# Patient Record
Sex: FEMALE | Race: WHITE | NOT HISPANIC OR LATINO | URBAN - METROPOLITAN AREA
[De-identification: names, ages, dates, MRNs, and addresses within clinical notes are randomized per-mention and may not be internally consistent; named-entity substitution may affect disease eponyms.]

---

## 2022-10-24 PROBLEM — F34.1 DYSTHYMIC DISORDER: Status: ACTIVE | Noted: 2022-10-24

## 2022-10-24 PROBLEM — F41.1 GENERALIZED ANXIETY DISORDER: Status: ACTIVE | Noted: 2022-10-24

## 2022-10-24 RX ORDER — DOXEPIN HYDROCHLORIDE 25 MG/1
25 CAPSULE ORAL
COMMUNITY
End: 2022-10-26 | Stop reason: SDUPTHER

## 2022-10-24 RX ORDER — ALPRAZOLAM 0.5 MG/1
TABLET ORAL
COMMUNITY
End: 2022-10-26 | Stop reason: SDUPTHER

## 2022-10-24 RX ORDER — ESCITALOPRAM OXALATE 10 MG/1
10 TABLET ORAL DAILY
COMMUNITY
End: 2022-10-26 | Stop reason: SDUPTHER

## 2022-10-26 ENCOUNTER — TELEMEDICINE (OUTPATIENT)
Dept: PSYCHIATRY | Facility: CLINIC | Age: 82
End: 2022-10-26
Payer: COMMERCIAL

## 2022-10-26 DIAGNOSIS — F34.1 DYSTHYMIC DISORDER: Primary | ICD-10-CM

## 2022-10-26 DIAGNOSIS — F41.1 GENERALIZED ANXIETY DISORDER: ICD-10-CM

## 2022-10-26 PROCEDURE — 99212 OFFICE O/P EST SF 10 MIN: CPT | Performed by: NURSE PRACTITIONER

## 2022-10-26 RX ORDER — DOXEPIN HYDROCHLORIDE 25 MG/1
CAPSULE ORAL
Qty: 180 CAPSULE | Refills: 1 | Status: SHIPPED | OUTPATIENT
Start: 2022-10-26

## 2022-10-26 RX ORDER — ESCITALOPRAM OXALATE 10 MG/1
TABLET ORAL
Qty: 90 TABLET | Refills: 1 | Status: SHIPPED | OUTPATIENT
Start: 2022-10-26

## 2022-10-26 RX ORDER — ALPRAZOLAM 0.5 MG/1
TABLET ORAL
Qty: 180 TABLET | Refills: 1 | Status: SHIPPED | OUTPATIENT
Start: 2022-10-26

## 2022-10-26 NOTE — PSYCH
Virtual Regular Visit    Verification of patient location:t at home    Patient is located in the following state in which I hold an active license [place]      Assessment/Plan:       Diagnoses and all orders for this visit:    Dysthymic disorder  -     escitalopram (LEXAPRO) 10 mg tablet; Take 1 PO QD  -     doxepin (SINEquan) 25 mg capsule; Take 1-2 PO HS PRN    Generalized anxiety disorder  -     escitalopram (LEXAPRO) 10 mg tablet; Take 1 PO QD  -     ALPRAZolam (XANAX) 0 5 mg tablet; Take 1 PO BID    Other orders  -     Discontinue: ALPRAZolam (XANAX) 0 5 mg tablet; Take by mouth daily at bedtime as needed Take 1 PO BID  -     Discontinue: doxepin (SINEquan) 25 mg capsule; Take 25 mg by mouth daily at bedtime Take 1-2 PO HS PRN  -     Discontinue: escitalopram (LEXAPRO) 10 mg tablet; Take 10 mg by mouth daily Take 1 PO QD          Goals addressed in session:   Good Health  Counseling provided:      Treatment Recommendations- Risks Benefits       Immediate Medical/Psychiatric/Psychotherapy Treatments and Any Precautions:     Risks, Benefits And Possible Side Effects Of Medications:  Risks, benefits, and possible side effects of medications explained to patient and patient verbalizes understanding    Controlled Medication Discussion: The patient has been filling controlled prescriptions on time as prescribed to [facility]      Reason for visit is No chief complaint on file  Medication Management      Encounter provider GARCÍA Cedeno    Provider [place] [address] [place] [address]  [phone]      Recent Visits  No visits were found meeting these conditions    Showing recent visits within past 7 days and meeting all other requirements  Today's Visits  Date Type Provider Dept   10/26/22 Telemedicine Gage Cedeno [facility] today's visits and meeting all other requirements  Future Appointments  No visits were found meeting these conditions  Showing future appointments within next 150 days and meeting all other requirements       The patient was identified by name and date of birth  Ryan Gold was informed that this is a telemedicine visit and that the visit is being conducted throughthe [employer] platform  She agrees to proceed     My office door was closed  No one else was in the room  She acknowledged consent and understanding of privacy and security of the video platform  The patient has agreed to participate and understands they can discontinue the visit at any time  Patient is aware this is a billable service  Subjective    Ryan Gold is a 81 y o  female    here today for a med check  This was over [facility]    normal appetite      HPI Mood good  Worried about daughter and her situation  Anxiety manageable  No problems with medication  Appetite Sleep good  Health OK  Denies SI/HI    No past medical history on file  No past surgical history on file  Current Outpatient Medications   Medication Sig Dispense Refill   • ALPRAZolam (XANAX) 0 5 mg tablet Take 1 PO  tablet 1   • doxepin (SINEquan) 25 mg capsule Take 1-2 PO HS  capsule 1   • escitalopram (LEXAPRO) 10 mg tablet Take 1 PO QD 90 tablet 1     No current facility-administered medications for this visit  Not on File    Social History     Substance and Sexual Activity   Drug Use Not on file       No family history on file          Objective    Mental status:  Appearance calm and cooperative  and adequate hygiene and grooming   Mood mood appropriate   Affect affect appropriate    Speech a normal rate and fluent   Thought Processes normal thought processes   Hallucinations no hallucinations present    Thought Content no delusions   Abnormal Thoughts no suicidal thoughts  and no homicidal thoughts    Orientation  oriented to person and place and time   Remote Memory short term memory intact and long term memory intact   Attention Span concentration intact   Intellect Appears to be of Average Intelligence   Insight Insight intact   Judgement judgment was intact   Muscle Strength Muscle strength and tone were normal and Normal gait    Language no difficulty naming common objects   Fund of Knowledge displays adequate knowledge of current events   Pain none   Pain Scale 0       Video Exam    There were no vitals filed for this visit      I spent 15  minutes directly with the patient during this visit    Patient Instructions   Continue Current Tx  Report Problems  Return 4 months       Visit Time    Visit Start Time: 2926  Visit Stop Time: 1321  Total Visit Duration: 19 min

## 2023-02-16 ENCOUNTER — TELEMEDICINE (OUTPATIENT)
Dept: PSYCHIATRY | Facility: CLINIC | Age: 83
End: 2023-02-16

## 2023-02-16 DIAGNOSIS — F34.1 DYSTHYMIC DISORDER: ICD-10-CM

## 2023-02-16 DIAGNOSIS — F41.1 GENERALIZED ANXIETY DISORDER: ICD-10-CM

## 2023-02-16 RX ORDER — DOXEPIN HYDROCHLORIDE 25 MG/1
CAPSULE ORAL
Qty: 180 CAPSULE | Refills: 1 | Status: SHIPPED | OUTPATIENT
Start: 2023-02-16

## 2023-02-16 RX ORDER — ALPRAZOLAM 0.5 MG/1
TABLET ORAL
Qty: 180 TABLET | Refills: 1 | Status: SHIPPED | OUTPATIENT
Start: 2023-02-16

## 2023-02-16 RX ORDER — ESCITALOPRAM OXALATE 10 MG/1
TABLET ORAL
Qty: 90 TABLET | Refills: 1 | Status: SHIPPED | OUTPATIENT
Start: 2023-02-16
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
## 2023-06-15 ENCOUNTER — TELEMEDICINE (OUTPATIENT)
Dept: PSYCHIATRY | Facility: CLINIC | Age: 83
End: 2023-06-15
Payer: COMMERCIAL

## 2023-06-15 ENCOUNTER — DOCUMENTATION (OUTPATIENT)
Dept: PSYCHIATRY | Facility: CLINIC | Age: 83
End: 2023-06-15

## 2023-06-15 DIAGNOSIS — F41.1 GENERALIZED ANXIETY DISORDER: Primary | ICD-10-CM

## 2023-06-15 DIAGNOSIS — F34.1 DYSTHYMIC DISORDER: ICD-10-CM

## 2023-06-15 PROCEDURE — 99214 OFFICE O/P EST MOD 30 MIN: CPT | Performed by: NURSE PRACTITIONER

## 2023-06-15 RX ORDER — ALPRAZOLAM 0.5 MG/1
TABLET ORAL
Qty: 180 TABLET | Refills: 1 | Status: SHIPPED | OUTPATIENT
Start: 2023-06-15

## 2023-06-15 RX ORDER — DOXEPIN HYDROCHLORIDE 25 MG/1
CAPSULE ORAL
Qty: 180 CAPSULE | Refills: 1 | Status: SHIPPED | OUTPATIENT
Start: 2023-06-15

## 2023-06-15 RX ORDER — ESCITALOPRAM OXALATE 10 MG/1
TABLET ORAL
Qty: 90 TABLET | Refills: 1 | Status: SHIPPED | OUTPATIENT
Start: 2023-06-15

## 2023-06-15 NOTE — BH TREATMENT PLAN
TREATMENT PLAN (Medication Management Only)        [address] [place] [address]    Name and Date of Birth: Horacio Cota 81 y o  1940  Date of Treatment Plan: Farida 15, 2023  Diagnosis/Diagnoses:    1  Generalized anxiety disorder    2  Dysthymic disorder      Strengths/Personal Resources for Self-Care: supportive family, supportive friends, taking medications as prescribed  Area/Areas of need (in own words): anxiety, depression  1  Long Term Goal: improve control of depression  Target Date:6 months - 12/15/2023  Person/Persons responsible for completion of goal: Michael Malcolm  2  Short Term Objective (s) - How will we reach this goal?:   A  Provider new recommended medication/dosage changes and/or continue medication(s): continue current medications as prescribed Lexapro, Doxepin, Xanax  B  N/A   C  N/A  Target Date:6 months - 12/15/2023  Person/Persons Responsible for Completion of Goal: Michael Malcolm  Progress Towards Goals: stable  Treatment Modality: medication management every 4 months  Review due 180 days from date of this plan: 6 months - 12/15/2023  Expected length of service: maintenance  My Physician/PA/NP and I have developed this plan together and I agree to work on the goals and objectives  I understand the treatment goals that were developed for my treatment

## 2023-06-15 NOTE — PATIENT INSTRUCTIONS
Continue Current Tx  Given ACM # for help with My Chart  Report Problems  Aware I am retiring  Return 4 months

## 2023-06-15 NOTE — PSYCH
[facility]    Patient Name Asia Garcia     Date of Birth: 81 y o  1940      MRN: [id number]    Admission Date: several years ago    Date of Transfer: Farida 15, 2023    Admission Diagnosis:     Dysthymic D/O  Generalized Anxiety Disorder    Current Diagnosis:     No diagnosis found  Reason for Admission: Jaci Schmidt presented for treatment due to depression and anxiety  Primary complaints included DEPRESSIVE SYMPTOMS: unremarkable - euthymic mood and ANXIETY SYMPTOMS: unremarkable  Progress in Treatment: Jaci Schmidt was seen for Medication Management  During the course of treatment she      Episodes of Higher Level of Care: No    Transfer request Initiated by: Psychiatrist: Nurse Practitioner Edwin Lockett Therapist: None    Reason for Transfer Request: clinician leaving practice    Does this individual need a clinician with specialized training/expertise?: No    Is this client working with any other [facility] Providers/Therapists?  Psychiatrist: None Therapist: None    Other pertinent issues: None    Are there any specific individuals who would be a “best fit” or who have already agreed to accept this transfer request?      Psychiatrist: None   Therapist: None  Rationale: Not Applicable    Attempts to maintain the current therapeutic relationship: No    Transfer request routed to Clinical Coordinator for input and/or approval      Comments from other involved providers and/or clinical coordinator: None    ISMAEL CedilloNP06/15/23

## 2023-06-15 NOTE — PSYCH
Virtual Regular Visit    Verification of patient location: at home    Patient is located in the following state in which I [place] an [place]      Assessment/Plan:       Diagnoses and all orders for this visit:    Generalized anxiety disorder  -     escitalopram (LEXAPRO) 10 mg tablet; Take 1 PO QD  -     ALPRAZolam (XANAX) 0 5 mg tablet; Take 1 PO BID    Dysthymic disorder  -     escitalopram (LEXAPRO) 10 mg tablet; Take 1 PO QD  -     doxepin (SINEquan) 25 mg capsule; Take 1-2 PO HS PRN            Goals addressed in session:   Good Health  Fix sleep pattern  Counseling provided:      Treatment Recommendations- Risks Benefits       Immediate Medical/Psychiatric/Psychotherapy Treatments and Any Precautions:     Risks, Benefits And Possible Side Effects Of Medications:  Risks, benefits, and possible side effects of medications explained to patient and patient verbalizes understanding    Controlled Medication Discussion: The patient has been filling controlled prescriptions on time as prescribed to [place] [facility]      Reason for visit is No chief complaint on file  Medication Management     Encounter provider GARCÍA Redman    Provider [place] [address] [place] [address]  [phone]      Recent Visits  No visits were found meeting these conditions  Showing recent visits within past 7 days and meeting all other requirements  Today's Visits  Date Type Provider Dept   06/15/23 Telemedicine Gage Redman [facility] today's visits and meeting all other requirements  Future Appointments  No visits were found meeting these conditions  Showing future appointments within next 150 days and meeting all other requirements       The patient was identified by name and date of birth   Shelda Cranker was informed that this is a telemedicine visit and [facility] the [facility]  She agrees to proceed     My office door was closed  No one else was in the room  She acknowledged consent and understanding of privacy and security of the video platform  The patient has agreed to participate and understands they can discontinue the visit at any time  Patient is aware this is a billable service  Subjective    Rex Lutz is a 81 y o  female    Here today for a med check  This was via Amwell    normal appetite      HPI  Mood depressed  Cat of 20yrs just passed  Anxiety manageable  No  Problems with medication   Appetite good  Sleep fair- using Melatonin PRN  Denies SI/HI  Does not have a car    No past medical history on file  No past surgical history on file  Current Outpatient Medications   Medication Sig Dispense Refill   • ALPRAZolam (XANAX) 0 5 mg tablet Take 1 PO  tablet 1   • doxepin (SINEquan) 25 mg capsule Take 1-2 PO HS  capsule 1   • escitalopram (LEXAPRO) 10 mg tablet Take 1 PO QD 90 tablet 1     No current facility-administered medications for this visit  Not on File    Social History     Substance and Sexual Activity   Drug Use Not on file       No family history on file          Objective    Mental status:  Appearance calm and cooperative , adequate hygiene and grooming and good eye contact    Mood mood appropriate   Affect affect appropriate    Speech a normal rate and fluent   Thought Processes normal thought processes   Hallucinations no hallucinations present    Thought Content no delusions   Abnormal Thoughts no suicidal thoughts  and no homicidal thoughts    Orientation  oriented to person and place and time   Remote Memory short term memory intact and long term memory intact   Attention Span concentration intact   Intellect Appears to be of Average Intelligence   Insight Insight intact   Judgement judgment was intact   Muscle Strength Muscle strength and tone were normal and Normal gait Language no difficulty naming common objects   Fund of Knowledge displays adequate knowledge of current events   Pain none   Pain Scale 0       Video Exam    There were no vitals filed for this visit      I spent 15 minutes directly with the patient during this visit    Patient Instructions   Continue Current Tx  Given ACM # for help with My Chart  Report Problems  Aware I am retiring  Return 4 months       Visit Time    Visit Start Time: 1451  Visit Stop Time: 6287  Total Visit Duration: 32 mn

## 2023-06-15 NOTE — PSYCH
[facility]    Patient Name Sahil Winslow     Date of Birth: 81 y o  1940      MRN: [id number]    Admission Date: several years ago    Date of Transfer: Farida 15, 2023    Admission Diagnosis:     1  Dysthymic D/O  2  Generalized Anxiety Disorder    Current Diagnosis:     1  Generalized anxiety disorder  escitalopram (LEXAPRO) 10 mg tablet    ALPRAZolam (XANAX) 0 5 mg tablet      2  Dysthymic disorder  escitalopram (LEXAPRO) 10 mg tablet    doxepin (SINEquan) 25 mg capsule          Reason for Admission: Karla Diop presented for treatment due to depression and anxiety  Primary complaints included DEPRESSIVE SYMPTOMS: unremarkable - euthymic mood and ANXIETY SYMPTOMS: unremarkable  Progress in Treatment: Karla Diop was seen for Medication Management  During the course of treatment she      Episodes of Higher Level of Care: No    Transfer request Initiated by: Psychiatrist: Nurse Practitioner Jony Charles Therapist: None    Reason for Transfer Request: clinician leaving practice    Does this individual need a clinician with specialized training/expertise?: No    Is this client working with any other [facility] Providers/Therapists?  Psychiatrist: None Therapist: None    Other pertinent issues: None    Are there any specific individuals who would be a “best fit” or who have already agreed to accept this transfer request?      Psychiatrist: None   Therapist: None  Rationale: Not Applicable    Attempts to maintain the current therapeutic relationship: No    Transfer request routed to Clinical Coordinator for input and/or approval      Comments from other involved providers and/or clinical coordinator: None    GARCÍA Gregory06/15/23

## 2023-06-19 ENCOUNTER — TELEPHONE (OUTPATIENT)
Dept: PSYCHIATRY | Facility: CLINIC | Age: 83
End: 2023-06-19

## 2023-06-19 NOTE — TELEPHONE ENCOUNTER
"Message received from Son in law-AMELIA not on file-for Keon Lav that he has \"concerns over medication that client is taking, that she is non-compliant with meds, and up all night  \"   Message sent to Kwaga with above information      "

## 2023-11-15 ENCOUNTER — TELEPHONE (OUTPATIENT)
Dept: PSYCHIATRY | Facility: CLINIC | Age: 83
End: 2023-11-15

## 2023-11-15 NOTE — TELEPHONE ENCOUNTER
Spoke with Alisa Us, she is asking if a referral for a therapist can be places for mom as she was just diagnosed with cancer and she thinks a therapist will be good for mom    She's also worried that mom will not be able to figure out how to set up for her virtual appt on the 20th and is asking if someone here can walk her through it or if the appt can be done by talking on the phone

## 2023-11-20 ENCOUNTER — TELEMEDICINE (OUTPATIENT)
Dept: PSYCHIATRY | Facility: CLINIC | Age: 83
End: 2023-11-20
Payer: COMMERCIAL

## 2023-11-20 DIAGNOSIS — F34.1 DYSTHYMIC DISORDER: Primary | ICD-10-CM

## 2023-11-20 PROCEDURE — 99214 OFFICE O/P EST MOD 30 MIN: CPT | Performed by: PSYCHIATRY & NEUROLOGY

## 2023-11-20 RX ORDER — ESCITALOPRAM OXALATE 5 MG/1
5 TABLET ORAL DAILY
Qty: 30 TABLET | Refills: 1 | Status: SHIPPED | OUTPATIENT
Start: 2023-11-20
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
## 2023-11-22 ENCOUNTER — TELEPHONE (OUTPATIENT)
Dept: PSYCHIATRY | Facility: CLINIC | Age: 83
End: 2023-11-22

## 2023-11-22 NOTE — TELEPHONE ENCOUNTER
Pt returned call and stated she is going to a drs appt today to determine when she will be receiving chemo. Writer provided number for intake so she could call back when she has her availability.

## 2023-11-22 NOTE — TELEPHONE ENCOUNTER
Intake received ASAP referral for therapy services from Dr Inga Clark. Pt was called to offer therapy anywhere due to insurance and no availability with in person sessions at . Writer ERNST to call intake dept to discuss options.

## 2023-12-12 ENCOUNTER — TELEMEDICINE (OUTPATIENT)
Dept: PSYCHIATRY | Facility: CLINIC | Age: 83
End: 2023-12-12
Payer: COMMERCIAL

## 2023-12-12 DIAGNOSIS — F41.1 GENERALIZED ANXIETY DISORDER: Primary | ICD-10-CM

## 2023-12-12 DIAGNOSIS — F33.1 MAJOR DEPRESSIVE DISORDER, RECURRENT EPISODE, MODERATE (HCC): ICD-10-CM

## 2023-12-12 PROCEDURE — 99214 OFFICE O/P EST MOD 30 MIN: CPT | Performed by: PSYCHIATRY & NEUROLOGY

## 2023-12-12 RX ORDER — ALPRAZOLAM 0.5 MG/1
TABLET ORAL
Qty: 60 TABLET | Refills: 1 | Status: SHIPPED | OUTPATIENT
Start: 2023-12-12

## 2023-12-12 RX ORDER — ESCITALOPRAM OXALATE 5 MG/1
5 TABLET ORAL DAILY
Qty: 30 TABLET | Refills: 1 | Status: SHIPPED | OUTPATIENT
Start: 2023-12-12

## 2023-12-12 RX ORDER — DOXEPIN HYDROCHLORIDE 25 MG/1
CAPSULE ORAL
Qty: 60 CAPSULE | Refills: 1 | Status: SHIPPED | OUTPATIENT
Start: 2023-12-12

## 2023-12-12 RX ORDER — ESCITALOPRAM OXALATE 10 MG/1
TABLET ORAL
Qty: 30 TABLET | Refills: 1 | Status: SHIPPED | OUTPATIENT
Start: 2023-12-12

## 2023-12-12 NOTE — BH TREATMENT PLAN
My Note Incomplete (Auto-Saved) 4:33 PM      suggestion   You can't edit this note because you have it open on [employer] since 12/12/2023 4:35 PM EST.      suggestion  NoteWriter selections for this note haven't been saved. Expand All Collapse All     Virtual Regular Visit     Verification of patient location:     Patient is located at Home in the following state in which I hold an active license [place]        Assessment/Plan:     Problem List Items Addressed This Visit    None        Goals addressed in session: Goal 1            Reason for visit is       Chief Complaint   Patient presents with    Medication Management         Encounter provider Fabio Olszewski, MD     Provider located at [address] [place] [address],[address]  [address]  [phone]        Recent Visits  No visits were found meeting these conditions. Showing recent visits within past 7 days and meeting all other requirements  Today's Visits  Date Type Provider Dept   12/12/23 Telemedicine Fabio Olszewski, [address] today's visits and meeting all other requirements  Future Appointments  No visits were found meeting these conditions. Showing future appointments within next 150 days and meeting all other requirements        The patient was identified by name and date of birth. Brittney Mc was informed that this is a telemedicine visit and [facility] She agrees to proceed. .  My office door was closed. No one else was in the room. She acknowledged consent and understanding of privacy and security of the video platform. The patient has agreed to participate and understands they can discontinue the visit at any time. Patient is aware this is a billable service. Subjective  Brittney Mc is a 81 y.o. female with depression and anxiety presents for regular f/u  .   Compliant with meds, denies SE  Cancer w/u; preparing for chemo; busy with medical appts        HPI   Mood - remains depressed, concerned about health problems; but is able to do ADLs, takes care of herself  Anxiety - increased worries, feeling " anxious and jittery" - pt was unable to elaborate  Sleep - irregular hrs, takes melatonin 3 mg with good effect  Medical History   History reviewed. No pertinent past medical history. Surgical History   History reviewed. No pertinent surgical history. Current Medications          Current Outpatient Medications   Medication Sig Dispense Refill    ALPRAZolam (XANAX) 0.5 mg tablet Take 1 PO BID 60 tablet 0    doxepin (SINEquan) 25 mg capsule Take 1-2 PO HS PRN 60 capsule 0    escitalopram (LEXAPRO) 10 mg tablet Take 1 PO QD 30 tablet 0    escitalopram (LEXAPRO) 5 mg tablet Take 1 tablet (5 mg total) by mouth daily With 10 mg 30 tablet 1      No current facility-administered medications for this visit. Not on File     Review of Systems   Constitutional:  Negative for activity change and appetite change. Gastrointestinal:         Colon CA w/u    Psychiatric/Behavioral:  Positive for dysphoric mood. Negative for sleep disturbance and suicidal ideas. The patient is nervous/anxious. Video Exam     Vitals   There were no vitals filed for this visit. Physical Exam  Constitutional:       Appearance: Normal appearance. She is normal weight. Neurological:      Mental Status: She is alert. Psychiatric:         Attention and Perception: Attention and perception normal.         Mood and Affect: Mood is anxious and depressed. Affect is blunt. Speech: Speech normal.         Behavior: Behavior normal. Behavior is cooperative. Thought Content:  Thought content normal.         Judgment: Judgment normal.            Visit Time     Visit Start Time: 4.19 pm   Visit Stop Time: 4.33 pm   Total Visit Duration:  20 minutes

## 2023-12-12 NOTE — PSYCH
My Note Incomplete (Auto-Saved) 4:33 PM      suggestion   You can't edit this note because you have it open on [employer] since 12/12/2023 4:35 PM EST.      suggestion  NoteWriter selections for this note haven't been saved. Expand All Collapse All     Virtual Regular Visit     Verification of patient location:     Patient is located at Home in the following state in which I hold an active license [place]        Assessment/Plan:     Problem List Items Addressed This Visit    None        Goals addressed in session: Goal 1            Reason for visit is       Chief Complaint   Patient presents with    Medication Management         Encounter provider Evert Hilton MD     Provider located at [address] [place] [address],[address]  [address]  [phone]        Recent Visits  No visits were found meeting these conditions. Showing recent visits within past 7 days and meeting all other requirements  Today's Visits  Date Type Provider Dept   12/12/23 Telemedicine Evert Hilton, [address] today's visits and meeting all other requirements  Future Appointments  No visits were found meeting these conditions. Showing future appointments within next 150 days and meeting all other requirements        The patient was identified by name and date of birth. Wojciech Carrero was informed that this is a telemedicine visit and [facility] [employer] [facility]. She agrees to proceed. .  My office door was closed. No one else was in the room. She acknowledged consent and understanding of privacy and security of the video platform. The patient has agreed to participate and understands they can discontinue the visit at any time. Patient is aware this is a billable service. Subjective  Wojciech Carrero is a 81 y.o. female with depression and anxiety presents for regular f/u  .   Compliant with meds, denies SE  Cancer w/u; preparing for chemo; busy with medical appts        HPI   Mood - remains depressed, concerned about health problems; but is able to do ADLs, takes care of herself  Anxiety - increased worries, feeling " anxious and jittery" - pt was unable to elaborate  Sleep - irregular hrs, takes melatonin 3 mg with good effect  Medical History   History reviewed. No pertinent past medical history. Surgical History   History reviewed. No pertinent surgical history. Current Medications          Current Outpatient Medications   Medication Sig Dispense Refill    ALPRAZolam (XANAX) 0.5 mg tablet Take 1 PO BID 60 tablet 0    doxepin (SINEquan) 25 mg capsule Take 1-2 PO HS PRN 60 capsule 0    escitalopram (LEXAPRO) 10 mg tablet Take 1 PO QD 30 tablet 0    escitalopram (LEXAPRO) 5 mg tablet Take 1 tablet (5 mg total) by mouth daily With 10 mg 30 tablet 1      No current facility-administered medications for this visit. Not on File     Review of Systems   Constitutional:  Negative for activity change and appetite change. Gastrointestinal:         Colon CA w/u    Psychiatric/Behavioral:  Positive for dysphoric mood. Negative for sleep disturbance and suicidal ideas. The patient is nervous/anxious. Video Exam     Vitals   There were no vitals filed for this visit. Physical Exam  Constitutional:       Appearance: Normal appearance. She is normal weight. Neurological:      Mental Status: She is alert. Psychiatric:         Attention and Perception: Attention and perception normal.         Mood and Affect: Mood is anxious and depressed. Affect is blunt. Speech: Speech normal.         Behavior: Behavior normal. Behavior is cooperative. Thought Content:  Thought content normal.         Judgment: Judgment normal.            Visit Time     Visit Start Time: 4.19 pm   Visit Stop Time: 4.33 pm   Total Visit Duration:  20 minutes

## 2023-12-12 NOTE — PSYCH
My Note Incomplete (Auto-Saved) 4:33 PM      suggestion   You can't edit this note because you have it open on [employer] since 12/12/2023 4:35 PM EST.      suggestion  NoteWriter selections for this note haven't been saved. Expand All Collapse All     Virtual Regular Visit     Verification of patient location:     Patient is located at Home in the following state in which I hold an active license [place]        Assessment/Plan:     Problem List Items Addressed This Visit    None        Goals addressed in session: Goal 1            Reason for visit is       Chief Complaint   Patient presents with    Medication Management         Encounter provider Patty Brock MD     Provider located at [address] [place] [address],[address]  [address]  [phone]        Recent Visits  No visits were found meeting these conditions. Showing recent visits within past 7 days and meeting all other requirements  Today's Visits  Date Type Provider Dept   12/12/23 Telemedicine Patty Brock, [address] today's visits and meeting all other requirements  Future Appointments  No visits were found meeting these conditions. Showing future appointments within next 150 days and meeting all other requirements        The patient was identified by name and date of birth. Gentry Hwang was informed that this is a telemedicine visit and [facility] She agrees to proceed. .  My office door was closed. No one else was in the room. She acknowledged consent and understanding of privacy and security of the video platform. The patient has agreed to participate and understands they can discontinue the visit at any time. Patient is aware this is a billable service. Subjective  Gentry Hwang is a 81 y.o. female with depression and anxiety presents for regular f/u  .   Compliant with meds, denies SE  Cancer w/u; preparing for chemo; busy with medical appts        HPI   Mood - remains depressed, concerned about health problems; but is able to do ADLs, takes care of herself  Anxiety - increased worries, feeling " anxious and jittery" - pt was unable to elaborate  Sleep - irregular hrs, takes melatonin 3 mg with good effect  Medical History   History reviewed. No pertinent past medical history. Surgical History   History reviewed. No pertinent surgical history. Current Medications          Current Outpatient Medications   Medication Sig Dispense Refill    ALPRAZolam (XANAX) 0.5 mg tablet Take 1 PO BID 60 tablet 0    doxepin (SINEquan) 25 mg capsule Take 1-2 PO HS PRN 60 capsule 0    escitalopram (LEXAPRO) 10 mg tablet Take 1 PO QD 30 tablet 0    escitalopram (LEXAPRO) 5 mg tablet Take 1 tablet (5 mg total) by mouth daily With 10 mg 30 tablet 1      No current facility-administered medications for this visit. Not on File     Review of Systems   Constitutional:  Negative for activity change and appetite change. Gastrointestinal:         Colon CA w/u    Psychiatric/Behavioral:  Positive for dysphoric mood. Negative for sleep disturbance and suicidal ideas. The patient is nervous/anxious. Video Exam     Vitals   There were no vitals filed for this visit. Physical Exam  Constitutional:       Appearance: Normal appearance. She is normal weight. Neurological:      Mental Status: She is alert. Psychiatric:         Attention and Perception: Attention and perception normal.         Mood and Affect: Mood is anxious and depressed. Affect is blunt. Speech: Speech normal.         Behavior: Behavior normal. Behavior is cooperative. Thought Content:  Thought content normal.         Judgment: Judgment normal.            Visit Time     Visit Start Time: 4.19 pm   Visit Stop Time: 4.33 pm   Total Visit Duration:  20 minutes

## 2023-12-12 NOTE — PSYCH
Virtual Regular Visit    Verification of patient location:    Patient is located at Home in the following state in which I hold an active license [place]      Assessment/Plan:    Problem List Items Addressed This Visit    None      Goals addressed in session: Goal 1          Reason for visit is   Chief Complaint   Patient presents with   • Medication Management        Encounter provider Kenia Noble MD    Provider located at [address] [place] [address],[address]  [address] [place], [place] [phone]      Recent Visits  No visits were found meeting these conditions. Showing recent visits within past 7 days and meeting all other requirements  Today's Visits  Date Type Provider Dept   12/12/23 Telemedicine Kenia Noble, [address] today's visits and meeting all other requirements  Future Appointments  No visits were found meeting these conditions. Showing future appointments within next 150 days and meeting all other requirements       The patient was identified by name and date of birth. Etienne Bran was informed that this is a telemedicine visit and [facility] [address]. She agrees to proceed. .  My office door was closed. No one else was in the room. She acknowledged consent and understanding of privacy and security of the video platform. The patient has agreed to participate and understands they can discontinue the visit at any time. Patient is aware this is a billable service. Subjective  Etienne Bran is a 81 y.o. female with depression and anxiety presents for regular f/u  .   Compliant with meds, denies SE  Cancer w/u; preparing for chemo; busy with medical appts      HPI   Mood - remains depressed, concerned about health problems; but is able to do ADLs, takes care of herself  Anxiety - increased worries, feeling " anxious and jittery" - pt was unable to elaborate  Sleep - irregular hrs, takes melatonin 3 mg with good effect  History reviewed. No pertinent past medical history. History reviewed. No pertinent surgical history. Current Outpatient Medications   Medication Sig Dispense Refill   • ALPRAZolam (XANAX) 0.5 mg tablet Take 1 PO BID 60 tablet 0   • doxepin (SINEquan) 25 mg capsule Take 1-2 PO HS PRN 60 capsule 0   • escitalopram (LEXAPRO) 10 mg tablet Take 1 PO QD 30 tablet 0   • escitalopram (LEXAPRO) 5 mg tablet Take 1 tablet (5 mg total) by mouth daily With 10 mg 30 tablet 1     No current facility-administered medications for this visit. Not on File    Review of Systems   Constitutional:  Negative for activity change and appetite change. Gastrointestinal:         Colon CA w/u    Psychiatric/Behavioral:  Positive for dysphoric mood. Negative for sleep disturbance and suicidal ideas. The patient is nervous/anxious. Video Exam    There were no vitals filed for this visit. Physical Exam  Constitutional:       Appearance: Normal appearance. She is normal weight. Neurological:      Mental Status: She is alert. Psychiatric:         Attention and Perception: Attention and perception normal.         Mood and Affect: Mood is anxious and depressed. Affect is blunt. Speech: Speech normal.         Behavior: Behavior normal. Behavior is cooperative. Thought Content:  Thought content normal.         Judgment: Judgment normal.        Visit Time    Visit Start Time: 4.19 pm   Visit Stop Time: 4.33 pm   Total Visit Duration:  20 minutes

## 2023-12-12 NOTE — BH TREATMENT PLAN
My Note Incomplete (Auto-Saved) 4:33 PM      suggestion   You can't edit this note because you have it open on [employer] since 12/12/2023 4:35 PM EST.      suggestion  NoteWriter selections for this note haven't been saved. Expand All Collapse All     Virtual Regular Visit     Verification of patient location:     Patient is located at Home in the following state in which I hold an active license [place]        Assessment/Plan:     Problem List Items Addressed This Visit    None        Goals addressed in session: Goal 1            Reason for visit is       Chief Complaint   Patient presents with    Medication Management         Encounter provider Lon North MD     Provider located at [address] [place] [address],[address]  [address]  [phone]        Recent Visits  No visits were found meeting these conditions. Showing recent visits within past 7 days and meeting all other requirements  Today's Visits  Date Type Provider Dept   12/12/23 Telemedicine Lon North, [address] today's visits and meeting all other requirements  Future Appointments  No visits were found meeting these conditions. Showing future appointments within next 150 days and meeting all other requirements        The patient was identified by name and date of birth. Joesph Barrera was informed that this is a telemedicine visit and [facility] She agrees to proceed. .  My office door was closed. No one else was in the room. She acknowledged consent and understanding of privacy and security of the video platform. The patient has agreed to participate and understands they can discontinue the visit at any time. Patient is aware this is a billable service. Subjective  Joesph Barrera is a 81 y.o. female with depression and anxiety presents for regular f/u  .   Compliant with meds, denies SE  Cancer w/u; preparing for chemo; busy with medical appts        HPI   Mood - remains depressed, concerned about health problems; but is able to do ADLs, takes care of herself  Anxiety - increased worries, feeling " anxious and jittery" - pt was unable to elaborate  Sleep - irregular hrs, takes melatonin 3 mg with good effect  Medical History   History reviewed. No pertinent past medical history. Surgical History   History reviewed. No pertinent surgical history. Current Medications          Current Outpatient Medications   Medication Sig Dispense Refill    ALPRAZolam (XANAX) 0.5 mg tablet Take 1 PO BID 60 tablet 0    doxepin (SINEquan) 25 mg capsule Take 1-2 PO HS PRN 60 capsule 0    escitalopram (LEXAPRO) 10 mg tablet Take 1 PO QD 30 tablet 0    escitalopram (LEXAPRO) 5 mg tablet Take 1 tablet (5 mg total) by mouth daily With 10 mg 30 tablet 1      No current facility-administered medications for this visit. Not on File     Review of Systems   Constitutional:  Negative for activity change and appetite change. Gastrointestinal:         Colon CA w/u    Psychiatric/Behavioral:  Positive for dysphoric mood. Negative for sleep disturbance and suicidal ideas. The patient is nervous/anxious. Video Exam     Vitals   There were no vitals filed for this visit. Physical Exam  Constitutional:       Appearance: Normal appearance. She is normal weight. Neurological:      Mental Status: She is alert. Psychiatric:         Attention and Perception: Attention and perception normal.         Mood and Affect: Mood is anxious and depressed. Affect is blunt. Speech: Speech normal.         Behavior: Behavior normal. Behavior is cooperative. Thought Content:  Thought content normal.         Judgment: Judgment normal.            Visit Time     Visit Start Time: 4.19 pm   Visit Stop Time: 4.33 pm   Total Visit Duration:  20 minutes

## 2024-01-25 ENCOUNTER — TELEMEDICINE (OUTPATIENT)
Dept: PSYCHIATRY | Facility: CLINIC | Age: 84
End: 2024-01-25
Payer: COMMERCIAL

## 2024-01-25 DIAGNOSIS — F41.1 GENERALIZED ANXIETY DISORDER: ICD-10-CM

## 2024-01-25 DIAGNOSIS — F33.1 MAJOR DEPRESSIVE DISORDER, RECURRENT EPISODE, MODERATE (HCC): ICD-10-CM

## 2024-01-25 PROCEDURE — 99214 OFFICE O/P EST MOD 30 MIN: CPT | Performed by: PSYCHIATRY & NEUROLOGY

## 2024-01-25 RX ORDER — DOXEPIN HYDROCHLORIDE 25 MG/1
CAPSULE ORAL
Qty: 180 CAPSULE | Refills: 0 | Status: SHIPPED | OUTPATIENT
Start: 2024-01-25

## 2024-01-25 RX ORDER — ESCITALOPRAM OXALATE 10 MG/1
TABLET ORAL
Qty: 90 TABLET | Refills: 0 | Status: SHIPPED | OUTPATIENT
Start: 2024-01-25

## 2024-01-25 RX ORDER — ALPRAZOLAM 0.5 MG/1
TABLET ORAL
Qty: 60 TABLET | Refills: 1 | Status: SHIPPED | OUTPATIENT
Start: 2024-01-25

## 2024-01-25 RX ORDER — ESCITALOPRAM OXALATE 5 MG/1
5 TABLET ORAL DAILY
Qty: 90 TABLET | Refills: 0 | Status: SHIPPED | OUTPATIENT
Start: 2024-01-25

## 2024-01-25 NOTE — PSYCH
Virtual Regular Visit    Verification of patient location:    Patient is located at Home in the following state in which I hold an active license [place]      Assessment/Plan:    Problem List Items Addressed This Visit    None      Goals addressed in session: Goal 1          Reason for visit is   Chief Complaint   Patient presents with    Medication Management        Encounter provider Latha Villalobos MD    Provider located at [facility]  [address] [facility] [id number] [phone]      Recent Visits  No visits were found meeting these conditions.  Showing recent visits within past 7 days and meeting all other requirements  Today's Visits  Date Type Provider Dept   01/25/24 Telemedicine Latha Villalobos MD [facility]   Showing today's visits and meeting all other requirements  Future Appointments  No visits were found meeting these conditions.  Showing future appointments within next 150 days and meeting all other requirements       The patient was identified by name and date of birth. Jaclyn Acosta was informed that this is a telemedicine visit and that the visit is being conducted throughthe [employer] platform. She agrees to proceed..  My office door was closed. No one else was in the room.  She acknowledged consent and understanding of privacy and security of the video platform. The patient has agreed to participate and understands they can discontinue the visit at any time.    Patient is aware this is a billable service.     Subjective  Jaclyn Acosta is a 83 y.o. female with depression and anxiety presents for regular f/u  .  Compliant with meds, denies SE  S/p COVID 2 weeks ago - recovering  Will start chemo and radiation tx in 2 weeks  C/p pain and bleeding  (CA)  Didn`t start therapy yet    HPI   Mood - gets sad and upset about health problems; low energy - gets tired easily ( medical?); stays social with family  Anxiety - increased  worries, negative racing thoughts; feels scared. Denies panic attacks  Sleep - improved, 8 hrs  History reviewed. No pertinent past medical history.    History reviewed. No pertinent surgical history.    Current Outpatient Medications   Medication Sig Dispense Refill    ALPRAZolam (XANAX) 0.5 mg tablet Take 1 PO BID 60 tablet 1    doxepin (SINEquan) 25 mg capsule Take 1-2 PO HS PRN 60 capsule 1    escitalopram (LEXAPRO) 10 mg tablet Take 1 PO QD 30 tablet 1    escitalopram (LEXAPRO) 5 mg tablet Take 1 tablet (5 mg total) by mouth daily With 10 mg 30 tablet 1     No current facility-administered medications for this visit.        Not on File    Review of Systems   Constitutional:  Positive for activity change (less). Negative for appetite change.   Respiratory:          Recovering from COVID   Gastrointestinal:         Anal CA   Psychiatric/Behavioral:  Positive for dysphoric mood. Negative for sleep disturbance and suicidal ideas. The patient is nervous/anxious.        Video Exam    There were no vitals filed for this visit.    Physical Exam  Constitutional:       Appearance: Normal appearance. She is normal weight.   Neurological:      Mental Status: She is alert.   Psychiatric:         Attention and Perception: Attention and perception normal.         Mood and Affect: Mood is anxious and depressed. Affect is blunt.         Speech: Speech normal.         Behavior: Behavior normal. Behavior is cooperative.         Thought Content: Thought content normal.         Judgment: Judgment normal.          Visit Time    Visit Start Time: 2.55 pm   Visit Stop Time: 3.12 pm   Total Visit Duration:  30 minutes

## 2024-03-07 ENCOUNTER — TELEMEDICINE (OUTPATIENT)
Dept: PSYCHIATRY | Facility: CLINIC | Age: 84
End: 2024-03-07
Payer: COMMERCIAL

## 2024-03-07 DIAGNOSIS — F33.1 MAJOR DEPRESSIVE DISORDER, RECURRENT EPISODE, MODERATE (HCC): ICD-10-CM

## 2024-03-07 DIAGNOSIS — F41.1 GENERALIZED ANXIETY DISORDER: ICD-10-CM

## 2024-03-07 PROCEDURE — 99214 OFFICE O/P EST MOD 30 MIN: CPT | Performed by: PSYCHIATRY & NEUROLOGY

## 2024-03-07 RX ORDER — ALPRAZOLAM 0.5 MG/1
TABLET ORAL
Qty: 60 TABLET | Refills: 1 | Status: SHIPPED | OUTPATIENT
Start: 2024-03-07

## 2024-03-07 NOTE — PSYCH
"  Virtual Regular Visit    Verification of patient location:    Patient is located at Home in the following state in which I hold an active license [place]      Assessment/Plan:    Problem List Items Addressed This Visit    None      Goals addressed in session: Goal 1          Reason for visit is   Chief Complaint   Patient presents with    Medication Management        Encounter provider Latha Villalobos MD    Provider located at [facility]  [address] [facility] [id number] [phone]      Recent Visits  No visits were found meeting these conditions.  Showing recent visits within past 7 days and meeting all other requirements  Today's Visits  Date Type Provider Dept   03/07/24 Telemedicine Latha Villalobos MD [facility]   Showing today's visits and meeting all other requirements  Future Appointments  No visits were found meeting these conditions.  Showing future appointments within next 150 days and meeting all other requirements       The patient was identified by name and date of birth. Jaclyn Acosta was informed that this is a telemedicine visit and that the visit is being conducted throughthe Epic Embedded platform. She agrees to proceed..  My office door was closed. No one else was in the room.  She acknowledged consent and understanding of privacy and security of the video platform. The patient has agreed to participate and understands they can discontinue the visit at any time.    Patient is aware this is a billable service.     Subjective  Jaclyn Acosta is a 83 y.o. female with depression and anxiety presents for regular f/u  .  Compliant with meds, denies SE  Pt is completing chemo and radiation tx   Scheduled for mammogram  Pt lives with son and his wife; in contact with daughter daily  Pt hasn`t start therapy yet    HPI   Mood - reports as \" good\" ; sometimes gets sad and tried, but tries to do ADLs and keep busy  Anxiety - increased " health related worries; denies panic attacks  History reviewed. No pertinent past medical history.    History reviewed. No pertinent surgical history.    Current Outpatient Medications   Medication Sig Dispense Refill    ALPRAZolam (XANAX) 0.5 mg tablet Take 1 PO BID 60 tablet 1    doxepin (SINEquan) 25 mg capsule Take 1-2 PO HS  capsule 0    escitalopram (LEXAPRO) 10 mg tablet Take 1 PO QD 90 tablet 0    escitalopram (LEXAPRO) 5 mg tablet Take 1 tablet (5 mg total) by mouth daily With 10 mg 90 tablet 0     No current facility-administered medications for this visit.        Not on File    Review of Systems   Constitutional:  Negative for activity change and appetite change.   Gastrointestinal:         Rectal CA   Psychiatric/Behavioral:  Negative for dysphoric mood, sleep disturbance and suicidal ideas. The patient is nervous/anxious.        Video Exam    There were no vitals filed for this visit.    Physical Exam  Constitutional:       Appearance: Normal appearance. She is normal weight.   Neurological:      Mental Status: She is alert.   Psychiatric:         Attention and Perception: Attention and perception normal.         Mood and Affect: Mood is anxious. Affect is blunt.         Speech: Speech normal.         Behavior: Behavior normal. Behavior is cooperative.         Thought Content: Thought content normal.         Judgment: Judgment normal.          Visit Time    Visit Start Time: 2.59 pm   Visit Stop Time: 3.08 pm   Total Visit Duration:  20 minutes  TREATMENT PLAN (Medication Management Only)        [facility]    Name and Date of Birth:  Jaclyn Acosta 83 y.o. 1940  Date of Treatment Plan: March 7, 2024  Diagnosis/Diagnoses:    1. Major depressive disorder, recurrent episode, moderate (HCC)    2. Generalized anxiety disorder      Strengths/Personal Resources for Self-Care: supportive family, taking medications as prescribed.  Area/Areas of need  (in own words): anxiety, depression  1. Long Term Goal: continue improvement in anxiety.  Target Date:6 months - 9/7/2024  Person/Persons responsible for completion of goal: Jaclyn  2.  Short Term Objective (s) - How will we reach this goal?:   A. Provider new recommended medication/dosage changes and/or continue medication(s): continue current medications as prescribed Lexapro, Xanax, Doxepin.  B. N/A.  C. N/A.  Target Date:6 months - 9/7/2024  Person/Persons Responsible for Completion of Goal: Jaclyn  Progress Towards Goals: continuing treatment  Treatment Modality: medication management every 2 months  Review due 180 days from date of this plan: 6 months - 9/7/2024  Expected length of service: ongoing treatment  My Physician/PA/NP and I have developed this plan together and I agree to work on the goals and objectives. I understand the treatment goals that were developed for my treatment.

## 2024-05-07 ENCOUNTER — TELEMEDICINE (OUTPATIENT)
Dept: PSYCHIATRY | Facility: CLINIC | Age: 84
End: 2024-05-07
Payer: COMMERCIAL

## 2024-05-07 DIAGNOSIS — F33.1 MAJOR DEPRESSIVE DISORDER, RECURRENT EPISODE, MODERATE (HCC): ICD-10-CM

## 2024-05-07 DIAGNOSIS — F41.1 GENERALIZED ANXIETY DISORDER: ICD-10-CM

## 2024-05-07 PROCEDURE — 99214 OFFICE O/P EST MOD 30 MIN: CPT | Performed by: PSYCHIATRY & NEUROLOGY

## 2024-05-07 RX ORDER — ESCITALOPRAM OXALATE 5 MG/1
5 TABLET ORAL DAILY
Qty: 90 TABLET | Refills: 0 | Status: SHIPPED | OUTPATIENT
Start: 2024-05-07

## 2024-05-07 RX ORDER — ATORVASTATIN CALCIUM 40 MG/1
TABLET, FILM COATED ORAL
COMMUNITY
Start: 2024-02-26

## 2024-05-07 RX ORDER — OXYCODONE HYDROCHLORIDE 5 MG/1
5 TABLET ORAL 2 TIMES DAILY PRN
COMMUNITY
Start: 2024-03-21

## 2024-05-07 RX ORDER — ESCITALOPRAM OXALATE 10 MG/1
TABLET ORAL
Qty: 90 TABLET | Refills: 0 | Status: SHIPPED | OUTPATIENT
Start: 2024-05-07

## 2024-05-07 RX ORDER — ALPRAZOLAM 0.5 MG/1
TABLET ORAL
Qty: 60 TABLET | Refills: 2 | Status: SHIPPED | OUTPATIENT
Start: 2024-05-07

## 2024-05-07 RX ORDER — DOXEPIN HYDROCHLORIDE 25 MG/1
CAPSULE ORAL
Qty: 180 CAPSULE | Refills: 0 | Status: SHIPPED | OUTPATIENT
Start: 2024-05-07

## 2024-05-07 NOTE — PSYCH
"  Virtual Regular Visit    Verification of patient location:    Patient is located at Home in the following state in which I hold an active license [place]      Assessment/Plan:    Problem List Items Addressed This Visit    None      Goals addressed in session: Goal 1          Reason for visit is   Chief Complaint   Patient presents with    Medication Management        Encounter provider Latha Villalobos MD      Recent Visits  No visits were found meeting these conditions.  Showing recent visits within past 7 days and meeting all other requirements  Today's Visits  Date Type Provider Dept   05/07/24 Telemedicine Latha Villalobos MD [facility]   Showing today's visits and meeting all other requirements  Future Appointments  No visits were found meeting these conditions.  Showing future appointments within next 150 days and meeting all other requirements       The patient was identified by name and date of birth. Jaclyn Acosta was informed that this is a telemedicine visit and that the visit is being conducted throughthe Epic Embedded platform. She agrees to proceed..  My office door was closed. No one else was in the room.  She acknowledged consent and understanding of privacy and security of the video platform. The patient has agreed to participate and understands they can discontinue the visit at any time.    Patient is aware this is a billable service.     Subjective  Jaclyn Acosta is a 83 y.o. female with depression and anxiety presents for regular f/u  .  Compliant with meds, denies SE  Completed radiation and chemo; f/u in July HPI   Mood - reports as mostly good and stable; does ADLs, social with family and friends; active at baseline  Anxiety - sometimes feels \" anxious\" with worries and feeling \"tight\"; mild, \" manageable\"  Sleep - interrupted, takes naps sometimes    History reviewed. No pertinent past medical history.    History reviewed. No pertinent surgical history.    Current Outpatient Medications "   Medication Sig Dispense Refill    ALPRAZolam (XANAX) 0.5 mg tablet Take 1 PO BID 60 tablet 1    doxepin (SINEquan) 25 mg capsule Take 1-2 PO HS  capsule 0    escitalopram (LEXAPRO) 10 mg tablet Take 1 PO QD 90 tablet 0    escitalopram (LEXAPRO) 5 mg tablet Take 1 tablet (5 mg total) by mouth daily With 10 mg 90 tablet 0     No current facility-administered medications for this visit.        Not on File    Review of Systems   Constitutional:  Negative for activity change and appetite change.   Psychiatric/Behavioral:  Negative for dysphoric mood, sleep disturbance and suicidal ideas. The patient is nervous/anxious.        Video Exam    There were no vitals filed for this visit.    Physical Exam  Constitutional:       Appearance: Normal appearance. She is normal weight.   Neurological:      Mental Status: She is alert.   Psychiatric:         Attention and Perception: Attention and perception normal.         Mood and Affect: Mood normal. Affect is blunt.         Speech: Speech normal.         Behavior: Behavior normal. Behavior is cooperative.         Thought Content: Thought content normal.         Judgment: Judgment normal.          Visit Time    Visit Start Time: 1.58 pm   Visit Stop Time: 2.07 pm   Total Visit Duration:  20 minutes

## 2024-07-16 ENCOUNTER — TELEPHONE (OUTPATIENT)
Age: 84
End: 2024-07-16

## 2024-07-16 NOTE — TELEPHONE ENCOUNTER
Spoke with pt regarding therapy services, she stated she was no longer seeking therapy so she was taken off the wait list.

## 2024-07-29 ENCOUNTER — TELEMEDICINE (OUTPATIENT)
Dept: PSYCHIATRY | Facility: CLINIC | Age: 84
End: 2024-07-29
Payer: COMMERCIAL

## 2024-07-29 DIAGNOSIS — F41.1 GENERALIZED ANXIETY DISORDER: ICD-10-CM

## 2024-07-29 DIAGNOSIS — F33.1 MAJOR DEPRESSIVE DISORDER, RECURRENT EPISODE, MODERATE (HCC): Primary | ICD-10-CM

## 2024-07-29 PROCEDURE — 99214 OFFICE O/P EST MOD 30 MIN: CPT | Performed by: PSYCHIATRY & NEUROLOGY

## 2024-07-29 RX ORDER — ALPRAZOLAM 0.5 MG/1
TABLET ORAL
Qty: 60 TABLET | Refills: 2 | Status: SHIPPED | OUTPATIENT
Start: 2024-07-29

## 2024-07-29 RX ORDER — DOXEPIN HYDROCHLORIDE 25 MG/1
CAPSULE ORAL
Qty: 180 CAPSULE | Refills: 0 | Status: SHIPPED | OUTPATIENT
Start: 2024-07-29

## 2024-07-29 RX ORDER — ESCITALOPRAM OXALATE 10 MG/1
TABLET ORAL
Qty: 90 TABLET | Refills: 0 | Status: SHIPPED | OUTPATIENT
Start: 2024-07-29

## 2024-07-29 RX ORDER — ESCITALOPRAM OXALATE 5 MG/1
5 TABLET ORAL DAILY
Qty: 90 TABLET | Refills: 0 | Status: SHIPPED | OUTPATIENT
Start: 2024-07-29
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
## 2024-08-01 ENCOUNTER — TELEPHONE (OUTPATIENT)
Age: 84
End: 2024-08-01

## 2024-08-01 NOTE — BH TREATMENT PLAN
TREATMENT PLAN (Medication Management Only)        [facility]    Name and Date of Birth: Rohit Mckeon 81 y o  1940  Date of Treatment Plan: October 26, 2022  Diagnosis/Diagnoses:    1  Dysthymic disorder    2  Generalized anxiety disorder      Strengths/Personal Resources for Self-Care: supportive family, supportive friends, taking medications as prescribed, ability to adapt to life changes, ability to communicate needs, ability to communicate well, ability to listen, ability to reason, ability to understand psychiatric illness  Area/Areas of need (in own words): depression  1  Long Term Goal: maintain depression  Target Date:6 months - 4/26/2023  Person/Persons responsible for completion of goal: Maria Fernanda Fonseca  2  Short Term Objective (s) - How will we reach this goal?:   A  Provider new recommended medication/dosage changes and/or continue medication(s): continue current medications as prescribed Lexapro, Doxepin, Xanax  B  N/A   C  N/A  Target Date:6 months - 4/26/2023  Person/Persons Responsible for Completion of Goal: Maria Fernanda Fonseca  Progress Towards Goals: stable  Treatment Modality: medication management every 4 months  Review due 180 days from date of this plan: 6 months - 4/26/2023  Expected length of service: maintenance  My Physician/PA/NP and I have developed this plan together and I agree to work on the goals and objectives  I understand the treatment goals that were developed for my treatment  [Normal Breath Sounds] : Normal breath sounds [Normal Heart Sounds] : normal heart sounds [Abdomen Tenderness] : ~T ~M No abdominal tenderness [No Rash or Lesion] : No rash or lesion [Purpura] : no purpura  [Petechiae] : no petechiae [Skin Ulcer] : no ulcer [Skin Induration] : no induration [Alert] : alert [Oriented to Person] : oriented to person [Oriented to Place] : oriented to place [Oriented to Time] : oriented to time [Calm] : calm [de-identified] : wdwn female in  nad [de-identified] : non icteric sclera  PERRLA EOMS intact  and nl [de-identified] : No respiratory distress [de-identified] : soft, non distended, non tender to palpation, incision site C/D/I, no hernias no  signs  of  cellulitis   no  guarding  no  rebound

## 2024-08-01 NOTE — TELEPHONE ENCOUNTER
Patient called to schedule her follow up. Patient is now scheduled for 10/29/2024 at 2 pm virtually.

## 2024-10-24 DIAGNOSIS — F41.1 GENERALIZED ANXIETY DISORDER: ICD-10-CM

## 2024-10-24 RX ORDER — ALPRAZOLAM 0.5 MG
TABLET ORAL
Qty: 60 TABLET | Refills: 2 | Status: SHIPPED | OUTPATIENT
Start: 2024-10-24

## 2024-10-24 NOTE — TELEPHONE ENCOUNTER
Medication refill request. Patient is almost out of medication. Writer confirmed pharmacy on file.

## 2024-10-29 ENCOUNTER — TELEMEDICINE (OUTPATIENT)
Dept: PSYCHIATRY | Facility: CLINIC | Age: 84
End: 2024-10-29
Payer: COMMERCIAL

## 2024-10-29 DIAGNOSIS — F33.1 MAJOR DEPRESSIVE DISORDER, RECURRENT EPISODE, MODERATE (HCC): Primary | ICD-10-CM

## 2024-10-29 DIAGNOSIS — F41.1 GENERALIZED ANXIETY DISORDER: ICD-10-CM

## 2024-10-29 PROCEDURE — 99214 OFFICE O/P EST MOD 30 MIN: CPT | Performed by: PSYCHIATRY & NEUROLOGY

## 2024-10-29 RX ORDER — ALPRAZOLAM 0.5 MG
TABLET ORAL
Qty: 60 TABLET | Refills: 2 | Status: SHIPPED | OUTPATIENT
Start: 2024-10-29

## 2024-10-29 RX ORDER — ESCITALOPRAM OXALATE 10 MG/1
TABLET ORAL
Qty: 90 TABLET | Refills: 0 | Status: SHIPPED | OUTPATIENT
Start: 2024-10-29

## 2024-10-29 RX ORDER — ESCITALOPRAM OXALATE 5 MG/1
5 TABLET ORAL DAILY
Qty: 90 TABLET | Refills: 0 | Status: SHIPPED | OUTPATIENT
Start: 2024-10-29

## 2024-10-29 RX ORDER — DOXEPIN HYDROCHLORIDE 25 MG/1
CAPSULE ORAL
Qty: 180 CAPSULE | Refills: 0 | Status: SHIPPED | OUTPATIENT
Start: 2024-10-29
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
## 2024-10-29 NOTE — BH TREATMENT PLAN
TREATMENT PLAN (Medication Management Only)        [facility]    Name and Date of Birth:  Jaclyn Acosta 84 y.o. 1940  Date of Treatment Plan: October 29, 2024  Diagnosis/Diagnoses:    1. Major depressive disorder, recurrent episode, moderate (HCC)    2. Generalized anxiety disorder      Strengths/Personal Resources for Self-Care: taking medications as prescribed, motivation for treatment.  Area/Areas of need (in own words): anxiety, depression  1. Long Term Goal: maintain mood stability.  Target Date:6 months - 4/29/2025  Person/Persons responsible for completion of goal: Jaclyn  2.  Short Term Objective (s) - How will we reach this goal?:   A. Provider new recommended medication/dosage changes and/or continue medication(s): continue current medications as prescribed Lexapro, Xanax.  B. N/A.  C. N/A.  Target Date:6 months - 4/29/2025  Person/Persons Responsible for Completion of Goal: Jaclyn  Progress Towards Goals: continuing treatment  Treatment Modality: medication management every 3 months  Review due 180 days from date of this plan: 6 months - 4/29/2025  Expected length of service: ongoing treatment  My Physician/PA/NP and I have developed this plan together and I agree to work on the goals and objectives. I understand the treatment goals that were developed for my treatment.

## 2024-10-29 NOTE — ASSESSMENT & PLAN NOTE
Orders:    ALPRAZolam (XANAX) 0.5 mg tablet; Take 1 PO BID    escitalopram (LEXAPRO) 10 mg tablet; Take 1 PO QD

## 2025-01-12 DIAGNOSIS — F33.1 MAJOR DEPRESSIVE DISORDER, RECURRENT EPISODE, MODERATE (HCC): ICD-10-CM

## 2025-01-14 RX ORDER — DOXEPIN HYDROCHLORIDE 25 MG/1
CAPSULE ORAL
Qty: 180 CAPSULE | Refills: 0 | Status: SHIPPED | OUTPATIENT
Start: 2025-01-14 | End: 2025-01-21 | Stop reason: SDUPTHER

## 2025-01-20 DIAGNOSIS — F33.1 MAJOR DEPRESSIVE DISORDER, RECURRENT EPISODE, MODERATE (HCC): ICD-10-CM

## 2025-01-20 RX ORDER — ESCITALOPRAM OXALATE 5 MG/1
5 TABLET ORAL DAILY
Qty: 90 TABLET | Refills: 0 | Status: SHIPPED | OUTPATIENT
Start: 2025-01-20 | End: 2025-01-21 | Stop reason: SDUPTHER

## 2025-01-21 ENCOUNTER — TELEMEDICINE (OUTPATIENT)
Dept: PSYCHIATRY | Facility: CLINIC | Age: 85
End: 2025-01-21
Payer: COMMERCIAL

## 2025-01-21 DIAGNOSIS — F33.1 MAJOR DEPRESSIVE DISORDER, RECURRENT EPISODE, MODERATE (HCC): Primary | ICD-10-CM

## 2025-01-21 DIAGNOSIS — F41.1 GENERALIZED ANXIETY DISORDER: ICD-10-CM

## 2025-01-21 PROCEDURE — 99214 OFFICE O/P EST MOD 30 MIN: CPT | Performed by: PSYCHIATRY & NEUROLOGY

## 2025-01-21 RX ORDER — ALPRAZOLAM 0.5 MG
TABLET ORAL
Qty: 60 TABLET | Refills: 2 | Status: SHIPPED | OUTPATIENT
Start: 2025-01-21

## 2025-01-21 RX ORDER — ESCITALOPRAM OXALATE 5 MG/1
5 TABLET ORAL DAILY
Qty: 90 TABLET | Refills: 0 | Status: SHIPPED | OUTPATIENT
Start: 2025-01-21

## 2025-01-21 RX ORDER — DOXEPIN HYDROCHLORIDE 25 MG/1
CAPSULE ORAL
Qty: 180 CAPSULE | Refills: 0 | Status: SHIPPED | OUTPATIENT
Start: 2025-01-21

## 2025-01-21 RX ORDER — ESCITALOPRAM OXALATE 10 MG/1
TABLET ORAL
Qty: 90 TABLET | Refills: 0 | Status: SHIPPED | OUTPATIENT
Start: 2025-01-21

## 2025-01-21 NOTE — PSYCH
Virtual Regular Visit    Verification of patient location:    Patient is located at Home in the following state in which I hold an active license [place]      Assessment/Plan:  Assessment & Plan  Major depressive disorder, recurrent episode, moderate (HCC)    Orders:    doxepin (SINEquan) 25 mg capsule; take 1 to 2 capsules by mouth at bedtime if needed    escitalopram (LEXAPRO) 10 mg tablet; Take 1 PO QD    escitalopram (LEXAPRO) 5 mg tablet; Take 1 tablet (5 mg total) by mouth daily    Generalized anxiety disorder    Orders:    ALPRAZolam (XANAX) 0.5 mg tablet; Take 1 PO BID    escitalopram (LEXAPRO) 10 mg tablet; Take 1 PO QD       Problem List Items Addressed This Visit       Generalized anxiety disorder     Other Visit Diagnoses         Major depressive disorder, recurrent episode, moderate (HCC)    -  Primary            Goals addressed in session: Goal 1     Depression Follow-up Plan Completed: Not applicable    Reason for visit is   Chief Complaint   Patient presents with    Medication Management        Encounter provider Latha Villalobos MD      Recent Visits  No visits were found meeting these conditions.  Showing recent visits within past 7 days and meeting all other requirements  Today's Visits  Date Type Provider Dept   01/21/25 Telemedicine Latha Villalobos MD [facility]   Showing today's visits and meeting all other requirements  Future Appointments  No visits were found meeting these conditions.  Showing future appointments within next 150 days and meeting all other requirements       The patient was identified by name and date of birth. Jaclyn Acosta was informed that this is a telemedicine visit and that the visit is being conducted throughthe Epic Embedded platform. She agrees to proceed..  My office door was closed. No one else was in the room.  She acknowledged consent and understanding of privacy and security of the video platform. The patient has agreed to participate and understands they can  discontinue the visit at any time.    Patient is aware this is a billable service.     Subjective  Jaclyn Acosta is a 84 y.o. female with depression and anxiety presents for regular f/u  .  Compliant with meds, denies SE  I reviewed the chart  Pt continues f/u with surgery and oncology  Son`s wife moved out in October and  in November. Son found out last month and relapsed on heroin; currently uses.  His 28 y.o son helps with getting him in to rehab.  Pt lives with her son.  Daughter - busy with work; problems with 22 D      HPI   Mood - reports as mostly stable; denies depression. Pt does ADLs, functions at baseline  Anxiety - increased worries; gets easily overwhelmed. Denies panic attacks  Sleep - good    History reviewed. No pertinent past medical history.    History reviewed. No pertinent surgical history.    Current Outpatient Medications   Medication Sig Dispense Refill    ALPRAZolam (XANAX) 0.5 mg tablet Take 1 PO BID 60 tablet 2    atorvastatin (LIPITOR) 40 mg tablet take 1 tablet by mouth once daily 90 days      doxepin (SINEquan) 25 mg capsule take 1 to 2 capsules by mouth at bedtime if needed 180 capsule 0    escitalopram (LEXAPRO) 10 mg tablet Take 1 PO QD 90 tablet 0    escitalopram (LEXAPRO) 5 mg tablet take 1 tablet by mouth once daily 90 tablet 0    metFORMIN (GLUCOPHAGE) 1000 MG tablet Take 1,000 mg by mouth 2 (two) times a day      oxyCODONE (ROXICODONE) 5 immediate release tablet Take 5 mg by mouth 2 (two) times a day as needed       No current facility-administered medications for this visit.        Not on File    Review of Systems   Constitutional:  Negative for activity change and appetite change.   Psychiatric/Behavioral:  Negative for dysphoric mood, sleep disturbance and suicidal ideas. The patient is nervous/anxious.        Video Exam    There were no vitals filed for this visit.    Physical Exam  Constitutional:       Appearance: Normal appearance. She is normal weight.   Neurological:       Mental Status: She is alert.   Psychiatric:         Attention and Perception: Attention and perception normal.         Mood and Affect: Mood is anxious. Mood is not depressed. Affect is blunt.         Speech: Speech normal.         Behavior: Behavior normal. Behavior is cooperative.         Thought Content: Thought content normal.         Judgment: Judgment normal.          Visit Time  Face to face  Visit Start Time: 2.00 pm   Visit Stop Time: 2.11pm   Total Visit Duration:  23 minutes total spent in patient care

## 2025-04-15 ENCOUNTER — TELEPHONE (OUTPATIENT)
Dept: PSYCHIATRY | Facility: CLINIC | Age: 85
End: 2025-04-15

## 2025-04-15 ENCOUNTER — TELEMEDICINE (OUTPATIENT)
Dept: PSYCHIATRY | Facility: CLINIC | Age: 85
End: 2025-04-15
Payer: COMMERCIAL

## 2025-04-15 DIAGNOSIS — F41.1 GENERALIZED ANXIETY DISORDER: ICD-10-CM

## 2025-04-15 DIAGNOSIS — F33.1 MAJOR DEPRESSIVE DISORDER, RECURRENT EPISODE, MODERATE (HCC): ICD-10-CM

## 2025-04-15 PROCEDURE — 99214 OFFICE O/P EST MOD 30 MIN: CPT | Performed by: PSYCHIATRY & NEUROLOGY

## 2025-04-15 RX ORDER — DOXEPIN HYDROCHLORIDE 25 MG/1
CAPSULE ORAL
Qty: 180 CAPSULE | Refills: 0 | Status: SHIPPED | OUTPATIENT
Start: 2025-04-15

## 2025-04-15 RX ORDER — ESCITALOPRAM OXALATE 10 MG/1
TABLET ORAL
Qty: 90 TABLET | Refills: 0 | Status: SHIPPED | OUTPATIENT
Start: 2025-04-15

## 2025-04-15 RX ORDER — ALPRAZOLAM 0.5 MG
TABLET ORAL
Qty: 60 TABLET | Refills: 2 | Status: SHIPPED | OUTPATIENT
Start: 2025-04-15

## 2025-04-15 RX ORDER — ESCITALOPRAM OXALATE 5 MG/1
5 TABLET ORAL DAILY
Qty: 90 TABLET | Refills: 0 | Status: SHIPPED | OUTPATIENT
Start: 2025-04-15

## 2025-04-15 NOTE — PSYCH
MEDICATION MANAGEMENT NOTE    Name: Jaclyn Acosta      : 1940      MRN: [id number]  Encounter Provider: Latha Villalobos MD  Encounter Date: 4/15/2025   Encounter department: [facility] OUTPATIENT    Insurance: Payor: [employer]  REP / Plan: [employer] 65 PPO  REP / Product Type: Medicare HMO /      Reason for Visit:   Chief Complaint   Patient presents with    Medication Management   :  Assessment & Plan  Generalized anxiety disorder         Major depressive disorder, recurrent episode, moderate (HCC)             Treatment Recommendations:    Educated about diagnosis and treatment modalities. Verbalizes understanding and agreement with the treatment plan.  Discussed self monitoring of symptoms, and symptom monitoring tools.  Discussed medications and if treatment adjustment was needed or desired.  I am scheduling this patient out for greater than 3 months: No    Medications Risks/Benefits:      Risks, Benefits And Possible Side Effects Of Medications:    Risks, benefits, and possible side effects of medications explained to Jaclyn and she (or legal representative) verbalizes understanding and agreement for treatment.    Controlled Medication Discussion:     Jaclyn has been filling controlled prescriptions on time as prescribed according to [place] Prescription Drug Monitoring Program.      History of Present Illness       Pt presents for regular f/u .  Compliant with meds, denies SE  Pt denies acute medical problems  Son still using heroin  Mood - reports as mostly good and stable; has good enenrgy, keeps busy at home; does ADLs  Anxiety - some worries; denies panic attacks  Sleep, appetite - normal  Review Of Systems: A review of systems is obtained and is negative except for the pertinent positives listed in HPI/Subjective above.      Current Rating Scores:         Areas of Improvement: reviewed in HPI/Subjective Section    History reviewed. No  pertinent past medical history.  History reviewed. No pertinent surgical history.  Allergies: Not on File    Current Outpatient Medications   Medication Instructions    ALPRAZolam (XANAX) 0.5 mg tablet Take 1 PO BID    atorvastatin (LIPITOR) 40 mg tablet take 1 tablet by mouth once daily 90 days    doxepin (SINEquan) 25 mg capsule take 1 to 2 capsules by mouth at bedtime if needed    escitalopram (LEXAPRO) 10 mg tablet Take 1 PO QD    escitalopram (LEXAPRO) 5 mg, Oral, Daily    metFORMIN (GLUCOPHAGE) 1,000 mg, Oral, 2 times daily    oxyCODONE (ROXICODONE) 5 mg, Oral, 2 times daily PRN        Substance Abuse History:    Tobacco, Alcohol and Drug Use History     Tobacco Use    Smoking status: Not on file    Smokeless tobacco: Not on file   Substance Use Topics    Alcohol use: Not on file    Drug use: Not on file          Social History:    Social History     Socioeconomic History    Marital status:      Spouse name: Not on file    Number of children: Not on file    Years of education: Not on file    Highest education level: Not on file   Occupational History    Not on file   Other Topics Concern    Not on file   Social History Narrative    Not on file        Family Psychiatric History:     History reviewed. No pertinent family history.    Medical History Reviewed by provider this encounter:  Meds  Problems  Med Hx  Surg Hx  Fam Hx          Objective   There were no vitals taken for this visit.     Mental Status Evaluation:    Appearance age appropriate, casually dressed   Behavior cooperative, calm   Speech normal rate, normal volume   Mood euthymic   Affect constricted   Thought Processes organized, goal directed   Thought Content no overt delusions   Perceptual Disturbances: no auditory hallucinations, no visual hallucinations   Abnormal Thoughts  Risk Potential Suicidal ideation - None  Homicidal ideation - None  Potential for aggression - No   Orientation normal   Memory recent and remote memory  grossly intact   Consciousness alert and awake   Attention Span Concentration Span attention span and concentration are age appropriate   Intellect appears to be of average intelligence   Insight intact   Judgement intact   Muscle Strength and  Gait unable to assess today due to virtual visit   Motor activity unable to assess today due to virtual visit   Language normal   Fund of Knowledge normal   Pain none   Pain Scale 0       Laboratory Results: I have personally reviewed all pertinent laboratory/tests results        Suicide/Homicide Risk Assessment:    Risk of Harm to Self:  Based on today's assessment, Jaclyn presents the following risk of harm to self: none    Risk of Harm to Others:  Based on today's assessment, Jaclyn presents the following risk of harm to others: none    The following interventions are recommended: Continue medication management.    Psychotherapy Provided:     Individual psychotherapy provided: No    Treatment Plan:    Completed and signed during the session: Yes - Treatment Plan done but not signed at time of office visit due to: Plan reviewed by video and verbal consent given due to virtual visit. Treatment Plan sent to patient via [employer] for signature.    Goals: Progress towards Treatment Plan goals - Yes, progressing slowly, as evidenced by subjective findings in HPI/Subjective Section    Depression Follow-up Plan Completed: Not applicable    Note Share:        Administrative Statements   Administrative Statements   Encounter provider Latha Villalobos MD    The Patient is located at Home and in the following state in which I hold an active license [place].    The patient was identified by name and date of birth. Jaclyn Acosta was informed that this is a telemedicine visit and that the visit is being conducted through the Epic Embedded platform. She agrees to proceed..  My office door was closed. No one else was in the room.  She acknowledged consent and understanding of privacy and security of  the video platform. The patient has agreed to participate and understands they can discontinue the visit at any time.    I have spent a total time of 20 minutes in caring for this patient on the day of the visit/encounter including Risks and benefits of tx options, Instructions for management, Documenting in the medical record, and Reviewing/placing orders in the medical record (including tests, medications, and/or procedures), not including the time spent for establishing the audio/video connection.    Visit Time  Face to face  Visit Start Time: 2.00 pm   Visit Stop Time: 2.10 pm   Total Visit Duration:  20 minutes total spent in patient care    Latha Villalobos MD 04/15/25

## 2025-04-15 NOTE — BH TREATMENT PLAN
TREATMENT PLAN (Medication Management Only)        [facility]    Name and Date of Birth:  Jaclyn Acosta 84 y.o. 1940  MRN: [id number]  Date of Treatment Plan: April 15, 2025  Diagnosis/Diagnoses:    1. Generalized anxiety disorder    2. Major depressive disorder, recurrent episode, moderate (HCC)      Strengths/Personal Resources for Self-Care: taking medications as prescribed, motivation for treatment.  Area/Areas of need (in own words): anxiety, depression  1. Long Term Goal:   maintain mood stability.  Target Date:6 months - 10/15/2025  Person/Persons responsible for completion of goal: Jaclyn  2.  Short Term Objective (s) - How will we reach this goal?:   A.  Provider new recommended medication/dosage changes and/or continue medication(s): continue current medications as prescribed Lexapro, Xanax, and Doxepin.  B.  N/A.  C.  N/A.  Target Date:6 months - 10/15/2025  Person/Persons Responsible for Completion of Goal: Jaclyn  Progress Towards Goals: stable  Treatment Modality: medication management every 3 months  Review due 180 days from date of this plan: 6 months - 10/15/2025  Expected length of service: ongoing treatment unless revised  My Physician/PA/NP and I have developed this plan together and I agree to work on the goals and objectives. I understand the treatment goals that were developed for my treatment.   Electronic Signatures: on file (unless signed below)    Latha Villalobos MD 04/15/25

## 2025-05-12 ENCOUNTER — TELEPHONE (OUTPATIENT)
Dept: OTHER | Facility: OTHER | Age: 85
End: 2025-05-12

## 2025-05-12 NOTE — TELEPHONE ENCOUNTER
Pt reached answering service, she is calling bc she tried to fill her xanax and the pharmacist states the rx was cancelled--pt shows to have 2 refill on rx from 4/15 and is not sure why they say it's cancelled. Could you please call pharmacy/patient to assist. She has less than 3 days remaining.

## 2025-05-13 DIAGNOSIS — F41.1 GENERALIZED ANXIETY DISORDER: ICD-10-CM

## 2025-05-13 RX ORDER — ALPRAZOLAM 0.5 MG
TABLET ORAL
Qty: 60 TABLET | Refills: 2 | Status: SHIPPED | OUTPATIENT
Start: 2025-05-13

## 2025-05-13 NOTE — TELEPHONE ENCOUNTER
Called the pharmacy - pharmacy can't find the script from 4/15 with two refills on it and requested script be resent.

## 2025-05-13 NOTE — TELEPHONE ENCOUNTER
Called Jaclyn [phone] and left  informing her script was resent. Requested she call back if she has any questions.

## 2025-07-08 ENCOUNTER — TELEMEDICINE (OUTPATIENT)
Dept: PSYCHIATRY | Facility: CLINIC | Age: 85
End: 2025-07-08
Payer: COMMERCIAL

## 2025-07-08 ENCOUNTER — TELEPHONE (OUTPATIENT)
Dept: PSYCHIATRY | Facility: CLINIC | Age: 85
End: 2025-07-08

## 2025-07-08 DIAGNOSIS — F33.1 MAJOR DEPRESSIVE DISORDER, RECURRENT EPISODE, MODERATE (HCC): ICD-10-CM

## 2025-07-08 DIAGNOSIS — F41.1 GENERALIZED ANXIETY DISORDER: ICD-10-CM

## 2025-07-08 PROCEDURE — 99214 OFFICE O/P EST MOD 30 MIN: CPT | Performed by: PSYCHIATRY & NEUROLOGY

## 2025-07-08 RX ORDER — ALPRAZOLAM 0.5 MG
TABLET ORAL
Qty: 60 TABLET | Refills: 2 | Status: SHIPPED | OUTPATIENT
Start: 2025-07-08

## 2025-07-08 RX ORDER — DOXEPIN HYDROCHLORIDE 25 MG/1
CAPSULE ORAL
Qty: 180 CAPSULE | Refills: 0 | Status: SHIPPED | OUTPATIENT
Start: 2025-07-08

## 2025-07-08 RX ORDER — ESCITALOPRAM OXALATE 10 MG/1
TABLET ORAL
Qty: 90 TABLET | Refills: 0 | Status: SHIPPED | OUTPATIENT
Start: 2025-07-08

## 2025-07-08 RX ORDER — ESCITALOPRAM OXALATE 5 MG/1
5 TABLET ORAL DAILY
Qty: 90 TABLET | Refills: 0 | Status: SHIPPED | OUTPATIENT
Start: 2025-07-08

## 2025-07-08 NOTE — PSYCH
MEDICATION MANAGEMENT NOTE    Name: Jaclyn Acosta      : 1940      MRN: [id number]  Encounter Provider: Latha Villalobos MD  Encounter Date: 2025   Encounter department: [facility] OUTPATIENT    Insurance: Payor: [employer]  REP / Plan: [employer] 65 PPO  REP / Product Type: Medicare HMO /      Reason for Visit:   Chief Complaint   Patient presents with    Medication Management   :  Assessment & Plan  Major depressive disorder, recurrent episode, moderate (HCC)    Orders:    escitalopram (LEXAPRO) 5 mg tablet; Take 1 tablet (5 mg total) by mouth daily    escitalopram (LEXAPRO) 10 mg tablet; Take 1 PO QD    doxepin (SINEquan) 25 mg capsule; take 1 to 2 capsules by mouth at bedtime if needed    Generalized anxiety disorder    Orders:    escitalopram (LEXAPRO) 10 mg tablet; Take 1 PO QD    ALPRAZolam (XANAX) 0.5 mg tablet; Take 1 PO BID        Treatment Recommendations:    Educated about diagnosis and treatment modalities. Verbalizes understanding and agreement with the treatment plan.  Discussed self monitoring of symptoms, and symptom monitoring tools.  Discussed medications and if treatment adjustment was needed or desired.  Aware of 24 hour and weekend coverage for urgent situations accessed by calling [facility] main practice number  I am scheduling this patient out for greater than 3 months: No    Medications Risks/Benefits:      Risks, Benefits And Possible Side Effects Of Medications:    Risks, benefits, and possible side effects of medications explained to Jaclyn and she (or legal representative) verbalizes understanding and agreement for treatment.    Controlled Medication Discussion:     Jaclyn is using medication appropriately.      History of Present Illness       Pt presents for regular f/u .  Compliant with meds, denies SE  Pt c/o allergy and COPD exacerbation  Son lives with her; still using heroin  Daughter -  "problems with adoptive 23 y.o daughter  Pt reports feeling not depressed, sometimes gets tired, but is able to function. She likes go for a walk ; does ADLs  Anxiety - worries about her children, \" manageable\". Denies panic attacks  Sleep, appetite - same  Review Of Systems: A review of systems is obtained and is negative except for the pertinent positives listed in HPI/Subjective above.      Current Rating Scores:         Areas of Improvement: reviewed in HPI/Subjective Section      Past Medical History[1]  Past Surgical History[2]  Allergies: Allergies[3]    Current Outpatient Medications   Medication Instructions    ALPRAZolam (XANAX) 0.5 mg tablet Take 1 PO BID    atorvastatin (LIPITOR) 40 mg tablet take 1 tablet by mouth once daily 90 days    doxepin (SINEquan) 25 mg capsule take 1 to 2 capsules by mouth at bedtime if needed    escitalopram (LEXAPRO) 10 mg tablet Take 1 PO QD    escitalopram (LEXAPRO) 5 mg, Oral, Daily    metFORMIN (GLUCOPHAGE) 1,000 mg, Oral, 2 times daily    oxyCODONE (ROXICODONE) 5 mg, Oral, 2 times daily PRN        Substance Abuse History:    Tobacco, Alcohol and Drug Use History     Tobacco Use    Smoking status: Not on file    Smokeless tobacco: Not on file   Substance Use Topics    Alcohol use: Not on file    Drug use: Not on file          Social History:    Social History     Socioeconomic History    Marital status:      Spouse name: Not on file    Number of children: Not on file    Years of education: Not on file    Highest education level: Not on file   Occupational History    Not on file   Other Topics Concern    Not on file   Social History Narrative    Not on file        Family Psychiatric History:     Family History[4]    Medical History Reviewed by provider this encounter:  Meds  Problems  Med Hx  Surg Hx  Fam Hx          Objective   There were no vitals taken for this visit.     Mental Status Evaluation:    Appearance age appropriate, casually dressed   Behavior " cooperative, calm   Speech normal rate, normal volume   Mood euthymic   Affect constricted   Thought Processes organized, goal directed   Thought Content no overt delusions   Perceptual Disturbances: no auditory hallucinations, no visual hallucinations   Abnormal Thoughts  Risk Potential Suicidal ideation - None  Homicidal ideation - None  Potential for aggression - No   Orientation grossly oriented   Memory recent and remote memory grossly intact   Consciousness alert and awake   Attention Span Concentration Span attention span and concentration are age appropriate   Intellect appears to be of average intelligence   Insight intact   Judgement intact   Muscle Strength and  Gait unable to assess today due to virtual visit   Motor activity unable to assess today due to virtual visit   Language no difficulty naming common objects   Fund of Knowledge adequate knowledge of current events       Laboratory Results: I have personally reviewed all pertinent laboratory/tests results        Suicide/Homicide Risk Assessment:    Risk of Harm to Self:  Based on today's assessment, Jaclyn presents the following risk of harm to self: none    Risk of Harm to Others:  Based on today's assessment, Jaclyn presents the following risk of harm to others: none    The following interventions are recommended: Continue medication management.    Psychotherapy Provided:     Individual psychotherapy provided: No    Treatment Plan:    Completed and signed during the session: Not applicable - Treatment Plan not due at this session.    Goals: Progress towards Treatment Plan goals - Yes, progressing, as evidenced by subjective findings in HPI/Subjective Section    Depression Follow-up Plan Completed: Not applicable    Note Share:        Administrative Statements   Administrative Statements   Encounter provider Latha Villalobos MD    The Patient is located at Home and in the following state in which I hold an active license [place].    The patient was  identified by name and date of birth. Jaclyn Acosta was informed that this is a telemedicine visit and that the visit is being conducted through the Epic Embedded platform. She agrees to proceed..  My office door was closed. No one else was in the room.  She acknowledged consent and understanding of privacy and security of the video platform. The patient has agreed to participate and understands they can discontinue the visit at any time.    I have spent a total time of 20 minutes in caring for this patient on the day of the visit/encounter including Risks and benefits of tx options, Instructions for management, Documenting in the medical record, and Reviewing/placing orders in the medical record (including tests, medications, and/or procedures), not including the time spent for establishing the audio/video connection.    Visit Time  Face to face  Visit Start Time: 2.00 pm   Visit Stop Time: 2.09 pm   Total Visit Duration: 20 minutes total spent in patient care        Latha Villalobos MD 07/08/25       [1] No past medical history on file.  [2] No past surgical history on file.  [3] Not on File  [4] No family history on file.

## 2025-07-08 NOTE — ASSESSMENT & PLAN NOTE
Orders:    escitalopram (LEXAPRO) 10 mg tablet; Take 1 PO QD    ALPRAZolam (XANAX) 0.5 mg tablet; Take 1 PO BID

## 2025-07-08 NOTE — TELEPHONE ENCOUNTER
Writer called and scheduled 3 month follow up with Dr. Villalobos. Writer also took copayment over the phone.

## 2025-07-09 RX ORDER — DOXEPIN HYDROCHLORIDE 25 MG/1
CAPSULE ORAL
Qty: 180 CAPSULE | Refills: 0 | OUTPATIENT
Start: 2025-07-09

## 2025-07-09 RX ORDER — ESCITALOPRAM OXALATE 5 MG/1
5 TABLET ORAL DAILY
Qty: 90 TABLET | Refills: 0 | OUTPATIENT
Start: 2025-07-09

## 2025-07-09 RX ORDER — ESCITALOPRAM OXALATE 10 MG/1
10 TABLET ORAL DAILY
Qty: 90 TABLET | Refills: 0 | OUTPATIENT
Start: 2025-07-09

## 2025-07-11 NOTE — TELEPHONE ENCOUNTER
The patient contacted the office, informing the writer that her medication was denied. The writer informed the patient that the script were sent on 7/8 and the duplicates were denied.